# Patient Record
Sex: MALE | URBAN - NONMETROPOLITAN AREA
[De-identification: names, ages, dates, MRNs, and addresses within clinical notes are randomized per-mention and may not be internally consistent; named-entity substitution may affect disease eponyms.]

---

## 2017-09-22 ENCOUNTER — TELEPHONE (OUTPATIENT)
Dept: FAMILY MEDICINE CLINIC | Facility: CLINIC | Age: 22
End: 2017-09-22

## 2017-09-22 NOTE — TELEPHONE ENCOUNTER
Pt was approved by Dr. Juan Lincoln to come back as a new pt. (has medicaid ins) Has an appt scheduled on Oct 2nd. Pt has been in [facility] in [place], [place] for asthma attack.  They are going to be sending all records here, Sofi Curiel has already spoke

## 2017-10-02 ENCOUNTER — OFFICE VISIT (OUTPATIENT)
Dept: FAMILY MEDICINE CLINIC | Facility: CLINIC | Age: 22
End: 2017-10-02

## 2017-10-02 VITALS
OXYGEN SATURATION: 99 % | HEIGHT: 68 IN | HEART RATE: 74 BPM | WEIGHT: 155 LBS | BODY MASS INDEX: 23.49 KG/M2 | SYSTOLIC BLOOD PRESSURE: 118 MMHG | DIASTOLIC BLOOD PRESSURE: 76 MMHG | TEMPERATURE: 99 F

## 2017-10-02 DIAGNOSIS — F40.01 PANIC DISORDER WITH AGORAPHOBIA: ICD-10-CM

## 2017-10-02 DIAGNOSIS — J30.81 CAT ALLERGIES: ICD-10-CM

## 2017-10-02 DIAGNOSIS — F17.200 TOBACCO DEPENDENCE: ICD-10-CM

## 2017-10-02 DIAGNOSIS — J45.41 MODERATE PERSISTENT ASTHMA WITH ACUTE EXACERBATION: Primary | ICD-10-CM

## 2017-10-02 PROCEDURE — 99214 OFFICE O/P EST MOD 30 MIN: CPT | Performed by: FAMILY MEDICINE

## 2017-10-02 RX ORDER — ALPRAZOLAM 0.25 MG/1
0.25 TABLET ORAL 2 TIMES DAILY PRN
Qty: 30 TABLET | Refills: 0 | Status: SHIPPED | OUTPATIENT
Start: 2017-10-02 | End: 2017-12-04

## 2017-10-02 RX ORDER — ESCITALOPRAM OXALATE 10 MG/1
10 TABLET ORAL DAILY
Qty: 30 TABLET | Refills: 2 | Status: SHIPPED | OUTPATIENT
Start: 2017-10-02

## 2017-10-02 RX ORDER — ALBUTEROL SULFATE 90 UG/1
2 AEROSOL, METERED RESPIRATORY (INHALATION)
COMMUNITY
End: 2018-03-29

## 2017-10-02 NOTE — PATIENT INSTRUCTIONS
I reviewed emergency room records as well as asthma control test and asthma action plan. I discussed differentiating shortness of breath from anxiety versus asthma.   Of asked patient to use a spacer with Flovent and rinse mouth after use  Discussed approp

## 2017-10-02 NOTE — PROGRESS NOTES
HPI:    Patient ID: Estela Tristan is a 25year old male. Patient presents with:  Hospital F/U: asthma    HPI patient was seen in an emergency room in [place] 9/20/17 for exacerbation of asthma.   Patient reported his chest felt tight with a squeezing sensa Current Outpatient Prescriptions:  Albuterol Sulfate  (90 Base) MCG/ACT Inhalation Aero Soln Inhale 2 puffs into the lungs. Disp:  Rfl:    Fluticasone Propionate  MCG/ACT Inhalation Aerosol Inhale 1 puff into the lungs.  Disp:  Rf asthma. Of asked patient to use a spacer with Flovent and rinse mouth after use  Discussed appropriate use of rescue inhaler  Reviewed AAP, ACT score 7,agree with AAP  no changes made.   I reviewed signs and symptoms of depression and anxiety as well as co

## 2017-11-03 ENCOUNTER — TELEPHONE (OUTPATIENT)
Dept: FAMILY MEDICINE CLINIC | Facility: CLINIC | Age: 22
End: 2017-11-03

## 2017-11-03 NOTE — TELEPHONE ENCOUNTER
Lv Xiong has a appt on Monday and she would like to review a couple of things that Sana Francisco thinks Sina Chris should know.

## 2017-11-03 NOTE — TELEPHONE ENCOUNTER
Estela Johnson calls. States she wants to give Dr. Yariel Weinberg some information prior to pt OV scheduled on 11/6.  1. Wants to make sure his asthma is addressed because he is still having issues with it.   2. States pt's depression and anxiety are \"through the ro

## 2017-11-06 NOTE — PATIENT INSTRUCTIONS
Reviewed AAP, ACT score 25,agree with AAP  no changes made.   Encouraged continued counseling and psychiatry follow-up as scheduledCont counseling , f/u with psychiatrist as scheduled  Cont lexapro 10 mg q d and add seroquel 25 mg q hs, will titrate for eff

## 2017-11-06 NOTE — PROGRESS NOTES
Jaspal Marmolejo is a 25year old male. Patient presents with: Anxiety: f/u on meds. Pt needs refills.     HPI:   Takes xanax sparingly , lexapro seems to help depression but anxiety is a \"10\"  Pt reports he is seeing a counselor q 2 weeks, poor sleep qualit depression/ anxiety    EXAM:   /64   Temp 99 °F (37.2 °C) (Temporal)   Ht 68\"   Wt 160 lb   BMI 24.33 kg/m²   GENERAL: well developed, well nourished,in no apparent distress, well hydrated  SKIN: no rashes,no suspicious lesions  ENT: TMs: intact, go

## 2017-11-17 ENCOUNTER — TELEPHONE (OUTPATIENT)
Dept: FAMILY MEDICINE CLINIC | Facility: CLINIC | Age: 22
End: 2017-11-17

## 2017-11-17 NOTE — TELEPHONE ENCOUNTER
Pt saw Dr. Ricco Jay for some depression issues. He was put on some new medications and grandma has some questions about it. Said that pt had a major meltdown and is struggling a lot.  Said that last night he had a fit, punched a wall, cut himself with some

## 2017-11-18 NOTE — TELEPHONE ENCOUNTER
Spoke with Jw Mcneal on 11/17. States pt didn't start Seroquel 25mg qhs until 2 days ago----that was prescribed at his 11/6 OV. Still taking Lexapro 10mg qd. States pt had a \"major meltdown\" on Thursday night. Was depressed and angry, \"hated the world\".

## 2017-11-19 NOTE — TELEPHONE ENCOUNTER
While I know Brandiadelita Christopherur has his best interest in mind, we cannot discuss protected info. If she has concerns about him being a danger to himself or others, she needs to call police to bring him to the ER for eval and possible hospitalization.  I appreciate the

## 2017-11-29 ENCOUNTER — TELEPHONE (OUTPATIENT)
Dept: FAMILY MEDICINE CLINIC | Facility: CLINIC | Age: 22
End: 2017-11-29

## 2017-11-29 NOTE — TELEPHONE ENCOUNTER
rec'd a fax from [phone] ([facility]) requesting pt's medlist for upcoming appt with psychiatrist along with a consent form that pt signed authorizing us to release records.   Pt's medlist faxed to [phone] @ [phone]

## 2017-12-04 NOTE — PROGRESS NOTES
Christoph Vines is a 25year old male. Patient presents with:  Depression: W/ ANXIETY---- MED CHECK    HPI:   Patient seen last month was started on Seroquel 25 mg nightly, Lexapro 10 mg daily continued  Patient reports that he falls asleep quite readily with thoughts, no alcohol/ drug use, + previous hx of depression/ anxiety    EXAM:   /80   Pulse 76   Temp 98 °F (36.7 °C) (Temporal)   Wt 162 lb   SpO2 99%   BMI 24.63 kg/m²   GENERAL: well developed, well nourished,in no apparent distress, well hydrated

## 2017-12-04 NOTE — PATIENT INSTRUCTIONS
Strongly encouraged patient follow-up with scheduled psychiatry appointment. Continue biweekly counseling  Continue Seroquel 25 mg nightly and will increase escitalopram to 20 mg daily.   May use alprazolam as needed for severe anxiety  Follow-up 6 weeks

## 2018-02-10 ENCOUNTER — TELEPHONE (OUTPATIENT)
Dept: FAMILY MEDICINE CLINIC | Facility: CLINIC | Age: 23
End: 2018-02-10

## 2018-03-30 RX ORDER — ALBUTEROL SULFATE 90 UG/1
2 AEROSOL, METERED RESPIRATORY (INHALATION) EVERY 4 HOURS PRN
Qty: 1 INHALER | Refills: 2 | Status: SHIPPED | OUTPATIENT
Start: 2018-03-30 | End: 2018-06-06

## 2018-05-03 ENCOUNTER — MED REC SCAN ONLY (OUTPATIENT)
Dept: FAMILY MEDICINE CLINIC | Facility: CLINIC | Age: 23
End: 2018-05-03

## 2018-06-08 RX ORDER — ALBUTEROL SULFATE 90 MCG
HFA AEROSOL WITH ADAPTER (GRAM) INHALATION
Qty: 1 INHALER | Refills: 2 | Status: SHIPPED | OUTPATIENT
Start: 2018-06-08

## 2018-10-26 NOTE — TELEPHONE ENCOUNTER
LOV-12/4/2017  Last ACT- 11/10/2017 Score 25  Last refill: entered historically. Medication pending in encounter.
REFILL ALBUTEROL RESCUE INHALER   CALL TO [facility] IN [place]
Statement Selected

## 2019-12-24 ENCOUNTER — MED REC SCAN ONLY (OUTPATIENT)
Dept: FAMILY MEDICINE CLINIC | Facility: CLINIC | Age: 24
End: 2019-12-24

## 2023-07-12 PROCEDURE — 93010 ELECTROCARDIOGRAM REPORT: CPT | Performed by: INTERNAL MEDICINE

## (undated) NOTE — LETTER
ASTHMA ACTION PLAN for Arianna Messer     : 3/28/1995          Date: 2017    Aurea Wooten. [facility],  [facility], [address] [place] [id number] [phone] 1.   GREEN - GO!  % Personal Be Asthma Action Plan reviewed with patient (and caregiver if necessary) and a copy of the plan was given to the patient/caregiver. Asthma Action Plan reviewed with patient (and caregiver if necessary) on the phone and mailed copy to patient.          Phys

## (undated) NOTE — LETTER
ASTHMA ACTION PLAN for Arianna Messer     : 3/28/1995          Date: 10/2/2017    Aurea Wooten. [facility], [address] [facility], [address] [place] [id number] [phone] 1.   GREEN - GO!  % Personal Be a copy of the plan was given to the patient/caregiver. Asthma Action Plan reviewed with patient (and caregiver if necessary) on the phone and mailed copy to patient. Physician Patient Caretaker (if necessary)   Constanza Peng.  Chiki Mantilla, [place] Jean